# Patient Record
Sex: FEMALE | Race: BLACK OR AFRICAN AMERICAN | NOT HISPANIC OR LATINO | Employment: UNEMPLOYED | URBAN - METROPOLITAN AREA
[De-identification: names, ages, dates, MRNs, and addresses within clinical notes are randomized per-mention and may not be internally consistent; named-entity substitution may affect disease eponyms.]

---

## 2020-05-29 ENCOUNTER — TELEPHONE (OUTPATIENT)
Dept: EMERGENCY MEDICINE | Facility: HOSPITAL | Age: 55
End: 2020-05-29

## 2020-05-29 NOTE — TELEPHONE ENCOUNTER
Patient left voicemail requesting covid-19 results, returned call. Patient informed that test results are still in process and will be contacted upon completion.     Emily Funk PA-C

## 2021-01-29 PROBLEM — D35.1 PARATHYROID ADENOMA: Status: ACTIVE | Noted: 2021-01-29

## 2021-01-29 PROBLEM — E04.2 MULTINODULAR GOITER: Status: ACTIVE | Noted: 2021-01-29

## 2021-02-10 PROBLEM — E89.0 POSTOPERATIVE HYPOTHYROIDISM: Status: ACTIVE | Noted: 2021-02-10

## 2021-02-10 PROBLEM — E83.51 HYPOCALCEMIA: Status: ACTIVE | Noted: 2021-02-10

## 2021-02-10 PROBLEM — E89.2 HYPOPARATHYROIDISM AFTER PROCEDURE: Status: ACTIVE | Noted: 2021-02-10

## 2021-02-11 PROBLEM — E83.42 HYPOMAGNESEMIA: Status: ACTIVE | Noted: 2021-02-11

## 2021-02-12 PROBLEM — E89.0 STATUS POST THYROIDECTOMY: Status: ACTIVE | Noted: 2021-02-12

## 2021-02-12 PROBLEM — E83.51 IATROGENIC HYPOCALCEMIA: Status: ACTIVE | Noted: 2021-02-12

## 2021-02-12 PROBLEM — E89.0 POSTOPERATIVE HYPOTHYROIDISM: Status: RESOLVED | Noted: 2021-02-10 | Resolved: 2021-02-12

## 2021-02-12 PROBLEM — Z90.89 HX OF PARATHYROIDECTOMY: Status: ACTIVE | Noted: 2021-02-12

## 2021-02-12 PROBLEM — R74.01 TRANSAMINITIS: Status: ACTIVE | Noted: 2021-02-12

## 2021-02-12 PROBLEM — Z98.890 HX OF PARATHYROIDECTOMY: Status: ACTIVE | Noted: 2021-02-12

## 2021-02-13 PROBLEM — Z75.8 PROBLEM RELATED TO DISCHARGE PLANNING: Status: ACTIVE | Noted: 2021-02-13

## 2021-02-14 PROBLEM — D72.829 LEUKOCYTOSIS: Status: ACTIVE | Noted: 2021-02-14

## 2021-02-14 PROBLEM — J69.0 ASPIRATION PNEUMONIA: Status: ACTIVE | Noted: 2021-02-14

## 2021-02-18 PROBLEM — J69.0 ASPIRATION PNEUMONIA: Status: RESOLVED | Noted: 2021-02-14 | Resolved: 2021-02-18

## 2021-02-18 PROBLEM — D72.829 LEUKOCYTOSIS: Status: RESOLVED | Noted: 2021-02-14 | Resolved: 2021-02-18

## 2021-02-21 PROBLEM — I10 HYPERTENSION: Status: ACTIVE | Noted: 2021-02-21

## 2021-02-23 PROBLEM — Z75.8 PROBLEM RELATED TO DISCHARGE PLANNING: Status: RESOLVED | Noted: 2021-02-13 | Resolved: 2021-02-23

## 2021-02-24 PROBLEM — E83.42 HYPOMAGNESEMIA: Status: RESOLVED | Noted: 2021-02-11 | Resolved: 2021-02-24

## 2021-07-01 ENCOUNTER — PATIENT MESSAGE (OUTPATIENT)
Dept: ADMINISTRATIVE | Facility: OTHER | Age: 56
End: 2021-07-01